# Patient Record
Sex: FEMALE | Race: ASIAN
[De-identification: names, ages, dates, MRNs, and addresses within clinical notes are randomized per-mention and may not be internally consistent; named-entity substitution may affect disease eponyms.]

---

## 2017-12-13 ENCOUNTER — RESULT REVIEW (OUTPATIENT)
Age: 47
End: 2017-12-13

## 2018-12-03 ENCOUNTER — RESULT REVIEW (OUTPATIENT)
Age: 48
End: 2018-12-03

## 2019-07-01 ENCOUNTER — EMERGENCY (EMERGENCY)
Facility: HOSPITAL | Age: 49
LOS: 1 days | Discharge: HOME | End: 2019-07-01
Attending: STUDENT IN AN ORGANIZED HEALTH CARE EDUCATION/TRAINING PROGRAM | Admitting: STUDENT IN AN ORGANIZED HEALTH CARE EDUCATION/TRAINING PROGRAM
Payer: COMMERCIAL

## 2019-07-01 VITALS
RESPIRATION RATE: 18 BRPM | OXYGEN SATURATION: 100 % | TEMPERATURE: 99 F | DIASTOLIC BLOOD PRESSURE: 73 MMHG | HEART RATE: 58 BPM | SYSTOLIC BLOOD PRESSURE: 112 MMHG

## 2019-07-01 VITALS
OXYGEN SATURATION: 100 % | HEART RATE: 98 BPM | DIASTOLIC BLOOD PRESSURE: 104 MMHG | RESPIRATION RATE: 16 BRPM | TEMPERATURE: 99 F | SYSTOLIC BLOOD PRESSURE: 196 MMHG

## 2019-07-01 DIAGNOSIS — Z90.49 ACQUIRED ABSENCE OF OTHER SPECIFIED PARTS OF DIGESTIVE TRACT: Chronic | ICD-10-CM

## 2019-07-01 LAB
ALBUMIN SERPL ELPH-MCNC: 4 G/DL — SIGNIFICANT CHANGE UP (ref 3.3–5)
ALP SERPL-CCNC: 44 U/L — SIGNIFICANT CHANGE UP (ref 40–120)
ALT FLD-CCNC: 14 U/L — SIGNIFICANT CHANGE UP (ref 4–33)
ANION GAP SERPL CALC-SCNC: 12 MMO/L — SIGNIFICANT CHANGE UP (ref 7–14)
AST SERPL-CCNC: 21 U/L — SIGNIFICANT CHANGE UP (ref 4–32)
BASOPHILS # BLD AUTO: 0.03 K/UL — SIGNIFICANT CHANGE UP (ref 0–0.2)
BASOPHILS NFR BLD AUTO: 0.4 % — SIGNIFICANT CHANGE UP (ref 0–2)
BILIRUB SERPL-MCNC: 0.2 MG/DL — SIGNIFICANT CHANGE UP (ref 0.2–1.2)
BUN SERPL-MCNC: 10 MG/DL — SIGNIFICANT CHANGE UP (ref 7–23)
CALCIUM SERPL-MCNC: 9.2 MG/DL — SIGNIFICANT CHANGE UP (ref 8.4–10.5)
CHLORIDE SERPL-SCNC: 106 MMOL/L — SIGNIFICANT CHANGE UP (ref 98–107)
CO2 SERPL-SCNC: 23 MMOL/L — SIGNIFICANT CHANGE UP (ref 22–31)
CREAT SERPL-MCNC: 0.66 MG/DL — SIGNIFICANT CHANGE UP (ref 0.5–1.3)
EOSINOPHIL # BLD AUTO: 0.12 K/UL — SIGNIFICANT CHANGE UP (ref 0–0.5)
EOSINOPHIL NFR BLD AUTO: 1.4 % — SIGNIFICANT CHANGE UP (ref 0–6)
GLUCOSE SERPL-MCNC: 124 MG/DL — HIGH (ref 70–99)
HCT VFR BLD CALC: 42.6 % — SIGNIFICANT CHANGE UP (ref 34.5–45)
HGB BLD-MCNC: 14.1 G/DL — SIGNIFICANT CHANGE UP (ref 11.5–15.5)
IMM GRANULOCYTES NFR BLD AUTO: 0.4 % — SIGNIFICANT CHANGE UP (ref 0–1.5)
LYMPHOCYTES # BLD AUTO: 2.93 K/UL — SIGNIFICANT CHANGE UP (ref 1–3.3)
LYMPHOCYTES # BLD AUTO: 34.8 % — SIGNIFICANT CHANGE UP (ref 13–44)
MCHC RBC-ENTMCNC: 28.8 PG — SIGNIFICANT CHANGE UP (ref 27–34)
MCHC RBC-ENTMCNC: 33.1 % — SIGNIFICANT CHANGE UP (ref 32–36)
MCV RBC AUTO: 87.1 FL — SIGNIFICANT CHANGE UP (ref 80–100)
MONOCYTES # BLD AUTO: 0.53 K/UL — SIGNIFICANT CHANGE UP (ref 0–0.9)
MONOCYTES NFR BLD AUTO: 6.3 % — SIGNIFICANT CHANGE UP (ref 2–14)
NEUTROPHILS # BLD AUTO: 4.79 K/UL — SIGNIFICANT CHANGE UP (ref 1.8–7.4)
NEUTROPHILS NFR BLD AUTO: 56.7 % — SIGNIFICANT CHANGE UP (ref 43–77)
NRBC # FLD: 0 K/UL — SIGNIFICANT CHANGE UP (ref 0–0)
PLATELET # BLD AUTO: 342 K/UL — SIGNIFICANT CHANGE UP (ref 150–400)
PMV BLD: 9.5 FL — SIGNIFICANT CHANGE UP (ref 7–13)
POTASSIUM SERPL-MCNC: 4.2 MMOL/L — SIGNIFICANT CHANGE UP (ref 3.5–5.3)
POTASSIUM SERPL-SCNC: 4.2 MMOL/L — SIGNIFICANT CHANGE UP (ref 3.5–5.3)
PROT SERPL-MCNC: 7.2 G/DL — SIGNIFICANT CHANGE UP (ref 6–8.3)
RBC # BLD: 4.89 M/UL — SIGNIFICANT CHANGE UP (ref 3.8–5.2)
RBC # FLD: 13.1 % — SIGNIFICANT CHANGE UP (ref 10.3–14.5)
SODIUM SERPL-SCNC: 141 MMOL/L — SIGNIFICANT CHANGE UP (ref 135–145)
WBC # BLD: 8.43 K/UL — SIGNIFICANT CHANGE UP (ref 3.8–10.5)
WBC # FLD AUTO: 8.43 K/UL — SIGNIFICANT CHANGE UP (ref 3.8–10.5)

## 2019-07-01 PROCEDURE — 70450 CT HEAD/BRAIN W/O DYE: CPT | Mod: 26

## 2019-07-01 PROCEDURE — 99284 EMERGENCY DEPT VISIT MOD MDM: CPT | Mod: 25

## 2019-07-01 PROCEDURE — 93010 ELECTROCARDIOGRAM REPORT: CPT

## 2019-07-01 RX ORDER — ACETAMINOPHEN 500 MG
975 TABLET ORAL ONCE
Refills: 0 | Status: COMPLETED | OUTPATIENT
Start: 2019-07-01 | End: 2019-07-01

## 2019-07-01 RX ORDER — KETOROLAC TROMETHAMINE 30 MG/ML
15 SYRINGE (ML) INJECTION ONCE
Refills: 0 | Status: DISCONTINUED | OUTPATIENT
Start: 2019-07-01 | End: 2019-07-01

## 2019-07-01 RX ORDER — METOCLOPRAMIDE HCL 10 MG
10 TABLET ORAL ONCE
Refills: 0 | Status: COMPLETED | OUTPATIENT
Start: 2019-07-01 | End: 2019-07-01

## 2019-07-01 RX ORDER — SODIUM CHLORIDE 9 MG/ML
1000 INJECTION INTRAMUSCULAR; INTRAVENOUS; SUBCUTANEOUS ONCE
Refills: 0 | Status: COMPLETED | OUTPATIENT
Start: 2019-07-01 | End: 2019-07-01

## 2019-07-01 RX ADMIN — Medication 975 MILLIGRAM(S): at 14:18

## 2019-07-01 RX ADMIN — Medication 975 MILLIGRAM(S): at 16:41

## 2019-07-01 RX ADMIN — SODIUM CHLORIDE 1000 MILLILITER(S): 9 INJECTION INTRAMUSCULAR; INTRAVENOUS; SUBCUTANEOUS at 14:18

## 2019-07-01 RX ADMIN — Medication 15 MILLIGRAM(S): at 16:41

## 2019-07-01 RX ADMIN — Medication 10 MILLIGRAM(S): at 14:18

## 2019-07-01 NOTE — ED PROVIDER NOTE - CLINICAL SUMMARY MEDICAL DECISION MAKING FREE TEXT BOX
47yo woman with history of migraines presents with headache with aura starting 3 days ago with some improvement with imitrex and advil but new onset left facial paresthesia with no other neurologic deficits. Will obtain h/h, electrolytes, pain control, give fluids, and check CTH as pt has not had headache in many years and has a new neurologic complaint. Possible complex migraine, will continue to monitor and reassess.

## 2019-07-01 NOTE — ED ADULT NURSE NOTE - OBJECTIVE STATEMENT
A&OX3 c/o left arm and facial numbness with severe headache near occipital region radiating to neck with one episode of vomit, pt reports nausea dizziness, and chest tightness, denies fever chills sob, nsr on monitor, respirations equal and nonlabored sating  room air, pt ambulatory + ROM in upper and lower extremities, reports numbness tingling in left lip area to left cheek, son at bedside will monitor,

## 2019-07-01 NOTE — ED PROVIDER NOTE - ATTENDING CONTRIBUTION TO CARE
49 yo woman with PMH migraines presents to ED for evaluation of headache x 3 days. Patient reports she has some of the similar symptoms as her prior migraines. Denies neck stiffness, fevers, photophobia, no recent illness. Reported mild improvement in pain after taking an old Imitrex she had. Reports she is started to feel tingling sensation in left side of face. Denies any other sensorimotor changes or deficits.   on exam, patient is well appearing, AAOx3, RRR, CTA BL, unremarkable neuro exam.   Will check labs, imaging, medicate, reassess

## 2019-07-01 NOTE — ED PROVIDER NOTE - OBJECTIVE STATEMENT
49yo woman PMH migraines presents with headache x 3 days. Pt reports headache started gradually on Friday and is unsure whether it was similar to previous migraines as it had been many years since most recent migraine. She noted that she had visual auras similar to previous migraines initially and nausea this morning. She feels a pressure in the back of the head bilaterally and is now since yesterday traveling down the neck and she feel some pain with neck movement but is able to move the neck fully. She took half an  imitrex that she found with some relief and had been taking advil over the weekend. Most recently took advil at 4am. She decided to come in today because she noticed that she was having left facial paresthesia and heaviness. No blurry vision, no dysphagia, no change in hearing, no slurred speech no weakness or numbness in limbs, no cp, no SOB, no abdominal pain. She noted feeling warm intermittently but no measured fevers, no new rash. No trauma.

## 2019-07-01 NOTE — ED PROVIDER NOTE - PROGRESS NOTE DETAILS
Ursula Farrell, resident MD: pt reports improvement of headache. 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 Pt has a neurologist and will f/u with him. will discharge patient home at this time. printed out copies of results for patient to take home. discussed return precautions and need for outpatient follow up.

## 2019-07-01 NOTE — ED PROVIDER NOTE - NSFOLLOWUPINSTRUCTIONS_ED_ALL_ED_FT
Please follow up with your neurologist in the next few days to discuss the CT results.    Follow up with your primary care provider in the next few days to reassess your headache.    You can take 500mg tylenol every 6 hours or 1000mg every 8 hours as needed for pain.  You can also take 400mg ibuprofen every 6 hours as needed for pain, make sure to take with food.     Drink plenty of fluids to stay hydrated. You can drink sports drinks or pedialyte as they provide electrolyte replacement in addition to fluids.     Return to the ED for any worsening symptoms of headache, blurred vision, neck pain/stiffness, fever, rash, slurred speech, weakness in arms or legs, or any new or concerning symptoms.    Please read all attached.

## 2019-07-01 NOTE — ED ADULT TRIAGE NOTE - CHIEF COMPLAINT QUOTE
Pt c/o headache/lightheadedness x 3 days, c/o left side facial numbness x 9 am today. No other neuro deficits noted. Fit eval called no code stroke called.

## 2019-07-01 NOTE — ED PROVIDER NOTE - PHYSICAL EXAMINATION
General: well-appearing woman in no acute distress  Head: normocephalic, atraumatic  Eyes: PERRL, EOMI  Mouth: moist mucous membranes, bilateral equal palate elevation  Neck: supple neck, full ROM, no c-spine tenderness to palpation  CV: normal rate and rhythm  Respiratory: clear to auscultation bilaterally  Abdomen: soft, nontender, nondistended  Back: no midline tenderness to palpation, no CVAT  Neuro: alert and oriented x3, CN III-XII intact, speech clear, no pronator drift,  strength 5/5 UE and LE bilaterally, sensation intact  Extremities: no LE edema or tenderness to palpation, full ROM, peripheral pulses 2+ bilaterally

## 2019-07-01 NOTE — ED PROVIDER NOTE - NS ED ROS FT
General: +subjective fever  Head: +headache  Eyes: no eye pain, +aura  ENT: +neck pain   CV: no chest pain  Resp: no SOB, no cough  GI: +nausea, no vomiting, no abdominal pain, no blood in stool  : no dysuria  MSK: no joint pain  Skin: no rash  Neuro: no focal weakness, no change in sensation

## 2019-12-23 ENCOUNTER — RESULT REVIEW (OUTPATIENT)
Age: 49
End: 2019-12-23

## 2020-11-02 NOTE — ED PROVIDER NOTE - CONDITION AT DISCHARGE:
"-- DO NOT REPLY / DO NOT REPLY ALL --  -- Message is from the [employer]--    Order Request  Lab: blood work     Message / reason: Annual     Insurance type: [employer] Alex[employer] BLUE  Payor: [address] / Plan: PPO [id number] / Product Type: PPO MISC    Preferred Delivery Method   Call when ready for pickup - phone number to notify: [phone]     Caller Information       Type Contact Phone    10/30/2020 02:53 PM CDT Phone (Incoming) Adriana Andres (Self) [phone] (H)          Alternative phone number: NONE (PATIENT IS TRYING TO SCHEDULE LAB APPOINTMENT FOR TOMORROW 10/31    Turnaround time given to caller: ""This message will be sent to [facility] Provider's name]. The clinical team will fulfill your request as soon as they review your message. \""  "
Spoke with patient no need for orders no symptoms
What type of labs
Satisfactory

## 2021-01-18 ENCOUNTER — RESULT REVIEW (OUTPATIENT)
Age: 51
End: 2021-01-18

## 2022-01-10 ENCOUNTER — RESULT REVIEW (OUTPATIENT)
Age: 52
End: 2022-01-10

## 2022-11-18 PROBLEM — Z78.9 OTHER SPECIFIED HEALTH STATUS: Chronic | Status: ACTIVE | Noted: 2019-07-01

## 2022-11-18 PROBLEM — Z00.00 ENCOUNTER FOR PREVENTIVE HEALTH EXAMINATION: Status: ACTIVE | Noted: 2022-11-18

## 2022-12-21 ENCOUNTER — APPOINTMENT (OUTPATIENT)
Dept: OTOLARYNGOLOGY | Facility: CLINIC | Age: 52
End: 2022-12-21
Payer: COMMERCIAL

## 2022-12-21 VITALS
WEIGHT: 137 LBS | HEART RATE: 79 BPM | DIASTOLIC BLOOD PRESSURE: 93 MMHG | HEIGHT: 62 IN | BODY MASS INDEX: 25.21 KG/M2 | SYSTOLIC BLOOD PRESSURE: 141 MMHG

## 2022-12-21 DIAGNOSIS — Z78.9 OTHER SPECIFIED HEALTH STATUS: ICD-10-CM

## 2022-12-21 DIAGNOSIS — J30.2 OTHER SEASONAL ALLERGIC RHINITIS: ICD-10-CM

## 2022-12-21 PROCEDURE — 92557 COMPREHENSIVE HEARING TEST: CPT

## 2022-12-21 PROCEDURE — 99244 OFF/OP CNSLTJ NEW/EST MOD 40: CPT | Mod: 25

## 2022-12-21 PROCEDURE — 92550 TYMPANOMETRY & REFLEX THRESH: CPT

## 2022-12-21 RX ORDER — MONTELUKAST 10 MG/1
TABLET, FILM COATED ORAL
Refills: 0 | Status: ACTIVE | COMMUNITY

## 2022-12-21 RX ORDER — ALBUTEROL 90 MCG
AEROSOL (GRAM) INHALATION
Refills: 0 | Status: ACTIVE | COMMUNITY

## 2022-12-21 RX ORDER — NORETHINDRONE ACETATE AND ETHINYL ESTRADIOL AND FERROUS FUMARATE 1MG-20(24)
1-20 KIT ORAL
Refills: 0 | Status: ACTIVE | COMMUNITY
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
## 2022-12-21 NOTE — CONSULT LETTER
[FreeTextEntry2] : Omi Saleh MD [FreeTextEntry1] : Dear Dr. Saleh,\par \par Thanks for referring Roshni Ayers for evaluation of her left-sided ear fullness.  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  She also had an episode of disequilibrium while driving around that time, but in general has been out without dizziness or vertigo since.  She has a strong history of migraine with aura.  She has no history of recurrent ear infections or prior ear surgery.  She does complain of autophony in the left ear, but denies noise or pressure induced dizziness.\par \par Otoscopic exam today shows intact normal-appearing bilateral tympanic membranes without effusion or retraction, and bilateral facial nerve function is normal.  She has no spontaneous or gaze evoked nystagmus.  An audiogram performed today shows a left low-frequency hearing loss at 250 Hz rising to normal hearing with small air-bone gap, and normal hearing in the right ear.  These results appear largely similar to testing performed in your office in October 2022.\par \par I do not currently feel as though she is exhibiting manifestations of left Ménière's disease given the absence of prolonged spinning vertigo, and additionally because the low-frequency hearing loss in the left ear appears more conductive than sensorineural in nature.  I plan to check a temporal bone CT to exclude superior canal dehiscence syndrome, which occasionally can present in a similar manner.  We also check a new MRI given her history of headache and dizziness.  I suspect she may have vestibular migraine and discussed this with her in the office today, although this would not necessarily explain the low-frequency hearing loss in the left ear, which we will continue to monitor for progression.\par \par Thank you once again for the opportunity to participate in your patient's care, and I will keep you informed as to her progress.\par \par Best regards,\par \par Harvinder Ureña MD\par Otology/Neurotology\par [facility]\par [facility]\par \par

## 2022-12-23 ENCOUNTER — RESULT REVIEW (OUTPATIENT)
Age: 52
End: 2022-12-23

## 2022-12-29 ENCOUNTER — APPOINTMENT (OUTPATIENT)
Dept: CT IMAGING | Facility: CLINIC | Age: 52
End: 2022-12-29
Payer: COMMERCIAL

## 2022-12-29 ENCOUNTER — APPOINTMENT (OUTPATIENT)
Dept: MRI IMAGING | Facility: CLINIC | Age: 52
End: 2022-12-29
Payer: COMMERCIAL

## 2022-12-29 ENCOUNTER — OUTPATIENT (OUTPATIENT)
Dept: OUTPATIENT SERVICES | Facility: HOSPITAL | Age: 52
LOS: 1 days | End: 2022-12-29
Payer: COMMERCIAL

## 2022-12-29 DIAGNOSIS — H93.8X2 OTHER SPECIFIED DISORDERS OF LEFT EAR: ICD-10-CM

## 2022-12-29 DIAGNOSIS — R42 DIZZINESS AND GIDDINESS: ICD-10-CM

## 2022-12-29 DIAGNOSIS — Z90.49 ACQUIRED ABSENCE OF OTHER SPECIFIED PARTS OF DIGESTIVE TRACT: Chronic | ICD-10-CM

## 2022-12-29 PROCEDURE — 70553 MRI BRAIN STEM W/O & W/DYE: CPT

## 2022-12-29 PROCEDURE — 70553 MRI BRAIN STEM W/O & W/DYE: CPT | Mod: 26

## 2022-12-29 PROCEDURE — 70480 CT ORBIT/EAR/FOSSA W/O DYE: CPT | Mod: 26

## 2022-12-29 PROCEDURE — A9585: CPT

## 2022-12-29 PROCEDURE — 70480 CT ORBIT/EAR/FOSSA W/O DYE: CPT

## 2023-01-03 ENCOUNTER — APPOINTMENT (OUTPATIENT)
Dept: OTOLARYNGOLOGY | Facility: CLINIC | Age: 53
End: 2023-01-03
Payer: COMMERCIAL

## 2023-01-03 DIAGNOSIS — R42 DIZZINESS AND GIDDINESS: ICD-10-CM

## 2023-01-03 DIAGNOSIS — H93.292 OTHER ABNORMAL AUDITORY PERCEPTIONS, LEFT EAR: ICD-10-CM

## 2023-01-03 DIAGNOSIS — H93.8X2 OTHER SPECIFIED DISORDERS OF LEFT EAR: ICD-10-CM

## 2023-01-03 PROCEDURE — 99213 OFFICE O/P EST LOW 20 MIN: CPT | Mod: 95

## 2023-01-03 NOTE — ASSESSMENT
[FreeTextEntry1] : Patient presents for follow-up regarding symptoms.  Reports intermittent headaches but no disequilibrium.  Also notes that left ear fullness has improved.  She experiences minor discomfort when swallowing.  She does have a history of allergies.  I reviewed her recent MRI and CT results and discussed them with her.  MRI shows only mild chronic microvascular ischemic changes which are typical of migraine.  Temporal bone CT shows no middle ear or mastoid fluid, well-developed mastoid cavity, and no evidence of superior canal dehiscence in left ear.\par \par Discussed potential etiologies, including intermittent eustachian tube dysfunction as cause of mild low-frequency left conductive hearing loss.  Also discussed other less likely possibilities such as mild cochlear hydrops, although hearing test patterns have consistently been conductive in nature and this condition causes a low-frequency sensorineural loss.  Discussed option of ECOG testing if symptoms worsen.   Also offered option of diagnostic myringotomy to see if this improves symptoms of ear fullness, but patient declined for now since symptoms have improved.   Would recommend monitoring low-frequency conductive loss with follow-up in 6 months to ensure no progression.

## 2023-01-03 NOTE — CONSULT LETTER
[FreeTextEntry2] : Omi Saleh MD [FreeTextEntry1] : Dear Dr. Saleh,\par \par Roshni Ayers presents for follow-up for her left ear fullness.  Since her last visit, she has undergone temporal bone CT which showed no evidence of superior canal dehiscence otosclerosis, or , middle ear/mastoid fluid.  She also underwent MRI which showed only mild chronic microvascular changes typical of patients with a migraine history.  \par \par We again discussed potential etiologies for her ear fullness, the most likely which I think is intermittent eustachian tube dysfunction given her history of allergies and otherwise unremarkable exam/imaging findings.  I discussed the option of diagnostic myringotomy and possible tube insertion, but for now given her symptoms have improved she would continue observation.  We will recheck her hearing in 6 months to ensure no progression of the mild conductive loss.\par \par Thank you once again for the opportunity to participate in your patient's care, and I will keep you informed as to her progress.\par \par Best regards,\par \par Harvinder Ureña MD\par Otology/Neurotology\par [facility]\par [facility] negative...

## 2023-01-03 NOTE — HISTORY OF PRESENT ILLNESS
[Home] : at home, [unfilled] , at the time of the visit. [Medical Office: ([facility])___] : at the medical office located in  [Verbal consent obtained from patient] : the patient, [unfilled]

## 2025-01-13 ENCOUNTER — APPOINTMENT (OUTPATIENT)
Facility: CLINIC | Age: 55
End: 2025-01-13

## 2025-01-13 ENCOUNTER — NON-APPOINTMENT (OUTPATIENT)
Age: 55
End: 2025-01-13

## 2025-01-13 VITALS — DIASTOLIC BLOOD PRESSURE: 81 MMHG | SYSTOLIC BLOOD PRESSURE: 121 MMHG

## 2025-01-13 DIAGNOSIS — Z98.890 OTHER SPECIFIED POSTPROCEDURAL STATES: ICD-10-CM

## 2025-01-13 DIAGNOSIS — Z01.419 ENCOUNTER FOR GYNECOLOGICAL EXAMINATION (GENERAL) (ROUTINE) W/OUT ABNORMAL FINDINGS: ICD-10-CM

## 2025-01-13 DIAGNOSIS — Z92.89 PERSONAL HISTORY OF OTHER MEDICAL TREATMENT: ICD-10-CM

## 2025-01-25 LAB
CYTOLOGY CVX/VAG DOC THIN PREP: NORMAL
HPV HIGH+LOW RISK DNA PNL CVX: NOT DETECTED

## 2025-07-07 ENCOUNTER — APPOINTMENT (OUTPATIENT)
Facility: CLINIC | Age: 55
End: 2025-07-07
Payer: COMMERCIAL

## 2025-07-07 VITALS — DIASTOLIC BLOOD PRESSURE: 75 MMHG | SYSTOLIC BLOOD PRESSURE: 161 MMHG

## 2025-07-07 PROBLEM — N91.2 AMENORRHEA: Status: ACTIVE | Noted: 2025-07-07

## 2025-07-07 PROBLEM — Z78.0 MENOPAUSE: Status: ACTIVE | Noted: 2025-07-07

## 2025-07-07 PROCEDURE — 99213 OFFICE O/P EST LOW 20 MIN: CPT

## 2025-07-07 PROCEDURE — 99459 PELVIC EXAMINATION: CPT

## 2025-07-21 ENCOUNTER — APPOINTMENT (OUTPATIENT)
Facility: CLINIC | Age: 55
End: 2025-07-21